# Patient Record
Sex: MALE | Race: WHITE | NOT HISPANIC OR LATINO | Employment: PART TIME
[De-identification: names, ages, dates, MRNs, and addresses within clinical notes are randomized per-mention and may not be internally consistent; named-entity substitution may affect disease eponyms.]

---

## 2019-10-02 ENCOUNTER — HEALTH MAINTENANCE LETTER (OUTPATIENT)
Age: 64
End: 2019-10-02

## 2019-12-16 ENCOUNTER — HEALTH MAINTENANCE LETTER (OUTPATIENT)
Age: 64
End: 2019-12-16

## 2021-01-15 ENCOUNTER — HEALTH MAINTENANCE LETTER (OUTPATIENT)
Age: 66
End: 2021-01-15

## 2021-09-05 ENCOUNTER — HEALTH MAINTENANCE LETTER (OUTPATIENT)
Age: 66
End: 2021-09-05

## 2022-02-19 ENCOUNTER — HEALTH MAINTENANCE LETTER (OUTPATIENT)
Age: 67
End: 2022-02-19

## 2022-04-16 ENCOUNTER — HEALTH MAINTENANCE LETTER (OUTPATIENT)
Age: 67
End: 2022-04-16

## 2022-10-22 ENCOUNTER — HEALTH MAINTENANCE LETTER (OUTPATIENT)
Age: 67
End: 2022-10-22

## 2023-04-01 ENCOUNTER — HEALTH MAINTENANCE LETTER (OUTPATIENT)
Age: 68
End: 2023-04-01

## 2023-06-01 ENCOUNTER — HEALTH MAINTENANCE LETTER (OUTPATIENT)
Age: 68
End: 2023-06-01

## 2023-07-06 ENCOUNTER — APPOINTMENT (OUTPATIENT)
Dept: MRI IMAGING | Facility: CLINIC | Age: 68
End: 2023-07-06
Attending: EMERGENCY MEDICINE
Payer: COMMERCIAL

## 2023-07-06 ENCOUNTER — HOSPITAL ENCOUNTER (EMERGENCY)
Facility: CLINIC | Age: 68
Discharge: HOME | End: 2023-07-06
Attending: EMERGENCY MEDICINE | Admitting: EMERGENCY MEDICINE
Payer: COMMERCIAL

## 2023-07-06 ENCOUNTER — APPOINTMENT (OUTPATIENT)
Dept: GENERAL RADIOLOGY | Facility: CLINIC | Age: 68
End: 2023-07-06
Attending: EMERGENCY MEDICINE
Payer: COMMERCIAL

## 2023-07-06 VITALS
TEMPERATURE: 97.8 F | HEART RATE: 71 BPM | RESPIRATION RATE: 18 BRPM | DIASTOLIC BLOOD PRESSURE: 94 MMHG | OXYGEN SATURATION: 98 % | SYSTOLIC BLOOD PRESSURE: 127 MMHG

## 2023-07-06 DIAGNOSIS — R42 DIZZINESS: ICD-10-CM

## 2023-07-06 DIAGNOSIS — R79.89 ELEVATED TROPONIN: ICD-10-CM

## 2023-07-06 DIAGNOSIS — R11.2 NAUSEA AND VOMITING, UNSPECIFIED VOMITING TYPE: ICD-10-CM

## 2023-07-06 DIAGNOSIS — T50.905A ADVERSE EFFECT OF DRUG, INITIAL ENCOUNTER: ICD-10-CM

## 2023-07-06 DIAGNOSIS — I95.1 ORTHOSTATIC HYPOTENSION: ICD-10-CM

## 2023-07-06 LAB
ALBUMIN SERPL BCG-MCNC: 4.2 G/DL (ref 3.5–5.2)
ALP SERPL-CCNC: 93 U/L (ref 40–129)
ALT SERPL W P-5'-P-CCNC: 11 U/L (ref 0–70)
ANION GAP SERPL CALCULATED.3IONS-SCNC: 13 MMOL/L (ref 7–15)
AST SERPL W P-5'-P-CCNC: 16 U/L (ref 0–45)
BASOPHILS # BLD AUTO: 0 10E3/UL (ref 0–0.2)
BASOPHILS NFR BLD AUTO: 0 %
BILIRUB DIRECT SERPL-MCNC: <0.2 MG/DL (ref 0–0.3)
BILIRUB SERPL-MCNC: 0.3 MG/DL
BUN SERPL-MCNC: 31.8 MG/DL (ref 8–23)
CALCIUM SERPL-MCNC: 10.5 MG/DL (ref 8.8–10.2)
CHLORIDE SERPL-SCNC: 97 MMOL/L (ref 98–107)
CREAT SERPL-MCNC: 1.59 MG/DL (ref 0.67–1.17)
DEPRECATED HCO3 PLAS-SCNC: 24 MMOL/L (ref 22–29)
EOSINOPHIL # BLD AUTO: 0.1 10E3/UL (ref 0–0.7)
EOSINOPHIL NFR BLD AUTO: 1 %
ERYTHROCYTE [DISTWIDTH] IN BLOOD BY AUTOMATED COUNT: 17.5 % (ref 10–15)
GFR SERPL CREATININE-BSD FRML MDRD: 46 ML/MIN/1.73M2
GLUCOSE SERPL-MCNC: 153 MG/DL (ref 70–99)
HCT VFR BLD AUTO: 37.2 % (ref 40–53)
HGB BLD-MCNC: 11.3 G/DL (ref 13.3–17.7)
HOLD SPECIMEN: NORMAL
HOLD SPECIMEN: NORMAL
IMM GRANULOCYTES # BLD: 0.1 10E3/UL
IMM GRANULOCYTES NFR BLD: 1 %
LIPASE SERPL-CCNC: 114 U/L (ref 13–60)
LYMPHOCYTES # BLD AUTO: 1.4 10E3/UL (ref 0.8–5.3)
LYMPHOCYTES NFR BLD AUTO: 13 %
MCH RBC QN AUTO: 22.9 PG (ref 26.5–33)
MCHC RBC AUTO-ENTMCNC: 30.4 G/DL (ref 31.5–36.5)
MCV RBC AUTO: 75 FL (ref 78–100)
MONOCYTES # BLD AUTO: 0.6 10E3/UL (ref 0–1.3)
MONOCYTES NFR BLD AUTO: 6 %
NEUTROPHILS # BLD AUTO: 8.2 10E3/UL (ref 1.6–8.3)
NEUTROPHILS NFR BLD AUTO: 79 %
NRBC # BLD AUTO: 0 10E3/UL
NRBC BLD AUTO-RTO: 0 /100
PLATELET # BLD AUTO: 344 10E3/UL (ref 150–450)
POTASSIUM SERPL-SCNC: 4 MMOL/L (ref 3.4–5.3)
PROT SERPL-MCNC: 7.5 G/DL (ref 6.4–8.3)
RBC # BLD AUTO: 4.94 10E6/UL (ref 4.4–5.9)
SODIUM SERPL-SCNC: 134 MMOL/L (ref 136–145)
TROPONIN T SERPL HS-MCNC: 28 NG/L
TROPONIN T SERPL HS-MCNC: 32 NG/L
WBC # BLD AUTO: 10.3 10E3/UL (ref 4–11)

## 2023-07-06 PROCEDURE — 250N000013 HC RX MED GY IP 250 OP 250 PS 637: Performed by: EMERGENCY MEDICINE

## 2023-07-06 PROCEDURE — 36415 COLL VENOUS BLD VENIPUNCTURE: CPT | Performed by: EMERGENCY MEDICINE

## 2023-07-06 PROCEDURE — 85025 COMPLETE CBC W/AUTO DIFF WBC: CPT | Performed by: EMERGENCY MEDICINE

## 2023-07-06 PROCEDURE — 99285 EMERGENCY DEPT VISIT HI MDM: CPT | Mod: 25

## 2023-07-06 PROCEDURE — 82310 ASSAY OF CALCIUM: CPT | Performed by: EMERGENCY MEDICINE

## 2023-07-06 PROCEDURE — 70553 MRI BRAIN STEM W/O & W/DYE: CPT | Mod: MA

## 2023-07-06 PROCEDURE — 83690 ASSAY OF LIPASE: CPT | Performed by: EMERGENCY MEDICINE

## 2023-07-06 PROCEDURE — 82040 ASSAY OF SERUM ALBUMIN: CPT | Performed by: EMERGENCY MEDICINE

## 2023-07-06 PROCEDURE — 80048 BASIC METABOLIC PNL TOTAL CA: CPT | Performed by: EMERGENCY MEDICINE

## 2023-07-06 PROCEDURE — A9585 GADOBUTROL INJECTION: HCPCS | Performed by: EMERGENCY MEDICINE

## 2023-07-06 PROCEDURE — 84484 ASSAY OF TROPONIN QUANT: CPT | Mod: 91 | Performed by: EMERGENCY MEDICINE

## 2023-07-06 PROCEDURE — 93005 ELECTROCARDIOGRAM TRACING: CPT

## 2023-07-06 PROCEDURE — 71046 X-RAY EXAM CHEST 2 VIEWS: CPT

## 2023-07-06 PROCEDURE — 255N000002 HC RX 255 OP 636: Performed by: EMERGENCY MEDICINE

## 2023-07-06 RX ORDER — MAGNESIUM HYDROXIDE/ALUMINUM HYDROXICE/SIMETHICONE 120; 1200; 1200 MG/30ML; MG/30ML; MG/30ML
30 SUSPENSION ORAL ONCE
Status: COMPLETED | OUTPATIENT
Start: 2023-07-06 | End: 2023-07-06

## 2023-07-06 RX ORDER — GADOBUTROL 604.72 MG/ML
14 INJECTION INTRAVENOUS ONCE
Status: COMPLETED | OUTPATIENT
Start: 2023-07-06 | End: 2023-07-06

## 2023-07-06 RX ADMIN — GADOBUTROL 14 ML: 604.72 INJECTION INTRAVENOUS at 19:59

## 2023-07-06 RX ADMIN — ALUMINUM HYDROXIDE, MAGNESIUM HYDROXIDE, AND DIMETHICONE 30 ML: 200; 20; 200 SUSPENSION ORAL at 17:50

## 2023-07-06 ASSESSMENT — ACTIVITIES OF DAILY LIVING (ADL)
ADLS_ACUITY_SCORE: 33
ADLS_ACUITY_SCORE: 35
ADLS_ACUITY_SCORE: 33

## 2023-07-06 NOTE — ED TRIAGE NOTES
Dizziness when standing/walking x 12 days. [facility] for [facility]. Pt reports vomiting due to dizziness. Denies numbness/tingling or blurred vision. VSS. ABCs intact. A/Ox4.

## 2023-07-06 NOTE — ED NOTES
Provider in Triage Rapid Assessment Evaluation    68 year old male  Chief Complaint   Patient presents with     Dizziness       Pertinent History:    Patient states that he experiences dizziness while standing. He does not experience dizziness while sitting. He states that for the past few weeks, walking down his short driveway to his mailbox, around 30 yards, causes him to vomit. He reports chronic shortness of breath.  He denies vision problems, asymmetric weakness in his arms or legs. He reports that he has experienced multiple episodes of vertigo during his life and states that he believes that these episodes were due to medications. He states that his recent dizziness does not feel similar to those episodes.     He reports that he began taking Valsartan on June 4, 2023. He states that he began taking this at least a week or two before these symptoms originally developed.    He states that he has suffered a couple falls this summer which he attributes to left foot drop.     He notes that [facility] and was told that he possibly had a heart valve problem.     Exam is notable for:    No abdominal tenderness.      Neuro exam is normal other than mild left foot drop which he reports is chronic.      Appropriate interventions for symptom management were initiated if applicable.  Appropriate diagnostic tests were initiated if indicated.    Important information for subsequent clinician:    Intake: 12 days dizziness mostly w position changes, now 3 days exertional nausea/vomiting.  MR Brain ordered, ? Atypical anginal sx w exertional n/v.  Ecg no ischemia, rhythm is sinus, trop added on.   [facility]         I briefly evaluated the patient and developed an initial plan of care. I discussed this plan and explained that this brief interaction does not constitute a full evaluation. Patient/family understands that they should wait to be fully evaluated and discuss any test results with another  clinician prior to leaving the hospital.    Scribe Disclosure:  IGodwin B.S. am serving as a scribe at on 7/6/2023 to document services personally performed by Carlos Garcia MD, based on my observations and the provider's statements to me.     Carlos Garcia MD  07/06/23 2747

## 2023-07-07 LAB
ATRIAL RATE - MUSE: 78 BPM
DIASTOLIC BLOOD PRESSURE - MUSE: NORMAL MMHG
INTERPRETATION ECG - MUSE: NORMAL
P AXIS - MUSE: NORMAL DEGREES
PR INTERVAL - MUSE: 216 MS
QRS DURATION - MUSE: 152 MS
QT - MUSE: 412 MS
QTC - MUSE: 469 MS
R AXIS - MUSE: -81 DEGREES
SYSTOLIC BLOOD PRESSURE - MUSE: NORMAL MMHG
T AXIS - MUSE: 31 DEGREES
VENTRICULAR RATE- MUSE: 78 BPM

## 2023-07-07 NOTE — DISCHARGE INSTRUCTIONS
STOP - SACUBITRIL medication   Return to ER immediately if you develop: worsening symptoms, Fever > 101, persistent nausea or vomiting OR you have any other concerns about your health.

## 2023-07-07 NOTE — ED PROVIDER NOTES
History     Chief Complaint:  Dizziness       The history is provided by the patient.      Abhishek Santos is a 68 year old male who presents with dizziness. The patient states that he experiences dizziness while standing. He does not experience dizziness while sitting. He states that for the past few weeks, walking down his short driveway to his mailbox, around 30 yards, causes him to vomit. He reports chronic shortness of breath.  He denies vision problems, asymmetric weakness in his arms or legs. He reports that he has experienced multiple episodes of vertigo during his life and states that he believes that these episodes were due to medications. He states that his recent dizziness does not feel similar to those episodes. He reports that he began taking Valsartan on June 4, 2023. He states that he began taking this at least a week or two before these symptoms originally developed. He states that he has suffered a couple falls this summer which he attributes to left foot drop. He notes that [facility] and was told that he possibly had a heart valve problem.     Independent Historian:   None - Patient Only    Review of External Notes:   [facility] with his medications and medical history.    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  Lumbar radiculopathy  Erectile disorder  Orthostatic hypotension  Major depressive disorder, recurrent, moderate  Hyperlipidemia   Obstructive sleep apnea  Spinal stenosis, severe  Former smoker  Diverticulitis of color  Overweight, BMI >35    Past Surgical History:    Tonsillectomy  Laminectomy, lumbar, x3     Physical Exam     Patient Vitals for the past 24 hrs:   BP Temp Temp src Pulse Resp SpO2    07/06/23 2130 -- -- -- 71 18 --   07/06/23 2014 (!) 127/94 -- -- 73 19 98 %   07/06/23 1417 (!) 133/91 97.8  F (36.6  C) Temporal 88 20 98 %        Physical Exam    HENT:  external ears unremarkable, Nares clear bilaterally, mmm, oropharynx without tonsillar hypertrophy/erythema/exudate    Eyes: PERRL, measuring 4mm bilaterally, EOMI, visual acuity and fields intact, conjunctiva and lids normal    Neck: supple, painless ROM, no cervical lymphadenopathy    Lungs:  CTAB,  no resp distress    CV: rrr, no m/r/g, ppi    Abd: soft, nontender, nondistended, no rebound/masses/guarding/hsm    Ext: no peripheral edema    Skin: warm, dry, well perused, no rashes/bruising/lesions on exposed skin    Neuro:   alert, follows commands, speech clear, CN 2-12 intact,   strength 5/5 and symmetric in BUE intrinsic hand muscles as well as BLE with the exception of chronic left foot drop (mild) unchanged per patient  SILT in all 4 ext  Negative Pronator drift  cerebellar testing unremarkable  gait stable with his cane that he uses at baseline    Psych: Normal mood, normal affect        Emergency Department Course     Imaging:  XR Chest 2 Views   Final Result   IMPRESSION: Negative chest.      MR Brain w/o & w Contrast   Final Result   IMPRESSION:   1.  No acute infarct, mass, mass effect, or hemorrhage.   2.  Moderate chronic small vessel ischemia.   3.  Moderate atrophy.   4.  Abnormal absent flow void from left ICA with normal intracranial M1 and A1 flow voids bilaterally.            Report per radiology    Laboratory:  Labs Ordered and Resulted from Time of ED Arrival to Time of ED Departure   BASIC METABOLIC PANEL - Abnormal       Result Value    Sodium 134 (*)     Potassium 4.0      Chloride 97 (*)     Carbon Dioxide (CO2) 24      Anion Gap 13      Urea Nitrogen 31.8 (*)     Creatinine 1.59 (*)     Calcium 10.5 (*)     Glucose 153 (*)     GFR Estimate 46 (*)    CBC WITH PLATELETS AND DIFFERENTIAL - Abnormal    WBC Count 10.3       RBC Count 4.94      Hemoglobin 11.3 (*)     Hematocrit 37.2 (*)     MCV 75 (*)     MCH 22.9 (*)     MCHC 30.4 (*)     RDW 17.5 (*)     Platelet Count 344      % Neutrophils 79      % Lymphocytes 13      % Monocytes 6      % Eosinophils 1      % Basophils 0      % Immature Granulocytes 1      NRBCs per 100 WBC 0      Absolute Neutrophils 8.2      Absolute Lymphocytes 1.4      Absolute Monocytes 0.6      Absolute Eosinophils 0.1      Absolute Basophils 0.0      Absolute Immature Granulocytes 0.1      Absolute NRBCs 0.0     TROPONIN T, HIGH SENSITIVITY - Abnormal    Troponin T, High Sensitivity 32 (*)    LIPASE - Abnormal    Lipase 114 (*)    TROPONIN T, HIGH SENSITIVITY - Abnormal    Troponin T, High Sensitivity 28 (*)    HEPATIC FUNCTION PANEL - Normal    Protein Total 7.5      Albumin 4.2      Bilirubin Total 0.3      Alkaline Phosphatase 93      AST 16      ALT 11      Bilirubin Direct <0.20            ECG results from 07/06/23   EKG 12-lead, tracing only     Value    Systolic Blood Pressure     Diastolic Blood Pressure     Ventricular Rate 78    Atrial Rate 78    WA Interval 216    QRS Duration 152        QTc 469    P Axis     R AXIS -81    T Axis 31    Interpretation ECG      Sinus rhythm with 1st degree A-V block  Left axis deviation  Right bundle branch block  Abnormal ECG  No previous ECGs available         Emergency Department Course & Assessments:    Interventions:  Medications   alum & mag hydroxide-simethicone (MAALOX) suspension 30 mL (30 mLs Oral $Given 7/6/23 1750)   gadobutrol (GADAVIST) injection 14 mL (14 mLs Intravenous $Given 7/6/23 1959)        Assessments:  1725 I obtained history and examined the patient.  1927 I reassessed the patient.  2025 I spoke again with the patient.  2110 I spoke again with the patient, we discussed disposition and he is comfortable with the plan for discharge to home.    Independent Interpretation (X-rays, CTs, rhythm strip):  None    Consultations/Discussion of  Management or Tests:  None     Social Determinants of Health affecting care:   None    Disposition:  The patient was discharged to home.     Impression & Plan      Medical Decision Makin-year-old gentleman here with positional dizziness for the last 12 days and 3 days of what he describes as exertional nausea and vomiting.  No focal acute neurologic deficit on examination.  He does have baseline left foot drop which is unchanged by his report.  His abdomen is benign.  Vital signs are unremarkable.  EKG shows a normal sinus rhythm  With no evidence of acute ischemia.  Given the report of exertional vomiting we did do the ECG as well as an initial delta troponin.  Initial troponin is mildly elevated repeat a delta is a downtrending.  He has no symptoms at rest and no classic chest pain or shortness of breath.  Again there is no abdominal tenderness I do not think he needs dedicated abdominal imaging.  The dizziness we did do an MRI of the brain which shows no acute findings.  He very clearly has orthostasis based on vital signs and return of symptoms on orthostatic testing here in the ED.  We reviewed his medication list and is a recent starting of Entresto which has known adverse side effects of hypotension and orthostasis.  Recommended he stop that medication for now check in with his primary doctor early next week to see how he is feeling and talk about other replacement medications.  The troponin trend in light of his symptoms makes it quite unlikely there is an occlusive coronary process here.  He was comfortable with the discharge home and understands what is known as well as was unknown what to watch out for when to return here in the emergency department.    Diagnosis:    ICD-10-CM    1. Dizziness  R42       2. Orthostatic hypotension  I95.1       3. Elevated troponin  R77.8       4. Nausea and vomiting, unspecified vomiting type  R11.2       5. Adverse effect of drug, initial encounter  T50.905A             Discharge Medications:  Discharge Medication List as of 7/6/2023  9:27 PM           Scribe Disclosure:  I, JACK Gresham am serving as a scribe at 7:30 PM on 7/6/2023 to document services personally performed by Carlos Garcia MD based on my observations and the provider's statements to me.        Carlos Garcia MD  07/06/23 [id number]

## 2024-01-14 ENCOUNTER — HEALTH MAINTENANCE LETTER (OUTPATIENT)
Age: 69
End: 2024-01-14

## 2024-06-02 ENCOUNTER — HEALTH MAINTENANCE LETTER (OUTPATIENT)
Age: 69
End: 2024-06-02

## 2024-08-11 ENCOUNTER — HEALTH MAINTENANCE LETTER (OUTPATIENT)
Age: 69
End: 2024-08-11

## 2025-02-23 ENCOUNTER — HEALTH MAINTENANCE LETTER (OUTPATIENT)
Age: 70
End: 2025-02-23

## 2025-06-15 ENCOUNTER — HEALTH MAINTENANCE LETTER (OUTPATIENT)
Age: 70
End: 2025-06-15